# Patient Record
(demographics unavailable — no encounter records)

---

## 2025-07-18 NOTE — DISCUSSION/SUMMARY
[de-identified] : Today I had a lengthy discussion with the patient regarding their left knee pain. I have addressed all the patient's concerns surrounding the pathology of their condition. I have reviewed the MRI with the patient in great detail. At this time, I would like the patient to continue with conservative management. I recommend the patient undergo a course of physical therapy for the left knee 2-3 times a week for a total of 8-12 weeks. A prescription was given for the physical therapy today. A prescription was also given for meloxicam. She should take it as directed. The patient understood and verbally agreed to the treatment plan. All of their questions were answered, and they were satisfied with the visit. The patient should call the office if they have any questions or experience worsening symptoms.  F/U PRN

## 2025-07-18 NOTE — PHYSICAL EXAM
[de-identified] : Physical Exam: General: Well appearing, no acute distress Neurologic: A&Ox3, No focal deficits Head: NCAT without abrasions, lacerations, or ecchymosis to head, face, or scalp Eyes: No scleral icterus, no gross abnormalities Respiratory: Equal chest wall expansion bilaterally, no accessory muscle use Lymphatic: No lymphadenopathy palpated Skin: Warm and dry Psychiatric: Normal mood and affect  Examination of the left knee reveals no effusion, no erythema or ecchymosis.  There are no leg length discrepancies appreciated. The patient's range of motion is from 0-120 with crepitus.  The patient has pain with patella mobility and apprehension.  Positive patella grind. Tender over MPFL. The patient displays tenderness to palpation in the medial joint line but no pain over lateral joint lines.  There is no patella facet tenderness.  The patient has a 4.5 out of 5 strength resisted straight leg raising as well as knee flexion and extension.  The knee demonstrates no laxity to Lachman testing, as well as anterior drawer. He is able to tolerate a pivot shift. Stable to posterior drawer. There is no valgus or varus instability. The patient has no pain with Fazal and Grind Testing.  The calf and thigh are soft, supple and nontender.  The patient is grossly neurovascularly intact distally.  [de-identified] :  Radiology imaging reviewed in office with the patient on 07/17/2025 and I agree with the radiologist's impression below:  MRI MIKE   Date of Exam: 06/26/2025  EXAM: MRI-LEFT KNEE NON CONTRAST  IMPRESSION:  Full-thickness cartilage fissure along medial patellar facet with 4 mm of delamination  No fracture  No evidence of meniscal or ligament tear  XRAY [facility]  EXAM: [id number] - XR KNEE COMP 4+ VIEWS LT - ORDERED BY: DEBRA EDWARDS  PROCEDURE DATE: 06/04/2025  IMPRESSION: No fracture, dislocation, or joint effusion.  Preserved joint spaces with smooth and intact articular surfaces. No joint margin erosions or intra-articular or periarticular calcifications.  Unremarkable quadriceps and patellar tendon shadows.  No destructive osseous lesions or periosteal reaction.  MRI may also provide additional diagnostic information with regard to potential internal derangement and/or occult osseous abnormality.

## 2025-07-18 NOTE — DISCUSSION/SUMMARY
[de-identified] : Today I had a lengthy discussion with the patient regarding their left knee pain. I have addressed all the patient's concerns surrounding the pathology of their condition. I have reviewed the MRI with the patient in great detail. At this time, I would like the patient to continue with conservative management. I recommend the patient undergo a course of physical therapy for the left knee 2-3 times a week for a total of 8-12 weeks. A prescription was given for the physical therapy today. A prescription was also given for meloxicam. She should take it as directed. The patient understood and verbally agreed to the treatment plan. All of their questions were answered, and they were satisfied with the visit. The patient should call the office if they have any questions or experience worsening symptoms.  F/U PRN

## 2025-07-18 NOTE — HISTORY OF PRESENT ILLNESS
[de-identified] : CHRISTOPHER SMITH is a 31 year old female being seen for left knee pain. She states she has been experiencing a few months of left knee pain. She states that she was playing volleyball by herself and unsure if the twisting motion caused her pain. She complains of pain with walking. She states she saw her PCP who referred her for an MRI of her left knee. She presents today with X-rays from [facility] and an MRI of her left knee from [facility].  She does have some buckling in her knee when she is going down the stairs. No radiating sx. She is ambulating without assistance. No other complaints.

## 2025-07-18 NOTE — HISTORY OF PRESENT ILLNESS
[de-identified] : CHRISTOPHER SMITH is a 31 year old female being seen for left knee pain. She states she has been experiencing a few months of left knee pain. She states that she was playing volleyball by herself and unsure if the twisting motion caused her pain. She complains of pain with walking. She states she saw her PCP who referred her for an MRI of her left knee. She presents today with X-rays from [facility] and an MRI of her left knee from [facility].  She does have some buckling in her knee when she is going down the stairs. No radiating sx. She is ambulating without assistance. No other complaints.